# Patient Record
Sex: FEMALE | Race: WHITE | NOT HISPANIC OR LATINO | Employment: FULL TIME | URBAN - METROPOLITAN AREA
[De-identification: names, ages, dates, MRNs, and addresses within clinical notes are randomized per-mention and may not be internally consistent; named-entity substitution may affect disease eponyms.]

---

## 2023-11-04 PROBLEM — E10.9 DIABETES MELLITUS TYPE 1 (MULTI): Status: ACTIVE | Noted: 2023-11-04

## 2023-11-04 PROBLEM — F41.9 ANXIETY: Status: ACTIVE | Noted: 2023-11-04

## 2023-11-04 PROBLEM — I10 HYPERTENSION, BENIGN: Status: ACTIVE | Noted: 2023-11-04

## 2023-11-04 PROBLEM — Z96.41 INSULIN PUMP IN PLACE: Status: ACTIVE | Noted: 2023-11-04

## 2023-11-04 PROBLEM — E78.5 HYPERLIPIDEMIA: Status: ACTIVE | Noted: 2023-11-04

## 2023-11-04 RX ORDER — BLOOD-GLUCOSE SENSOR
EACH MISCELLANEOUS
COMMUNITY

## 2023-11-04 RX ORDER — ESCITALOPRAM OXALATE 20 MG/1
20 TABLET ORAL
COMMUNITY
Start: 2022-04-20

## 2023-11-04 RX ORDER — INSULIN DEGLUDEC 100 U/ML
INJECTION, SOLUTION SUBCUTANEOUS
COMMUNITY
Start: 2020-12-03

## 2023-11-04 RX ORDER — EZETIMIBE 10 MG/1
10 TABLET ORAL
COMMUNITY
Start: 2022-04-12

## 2023-11-04 RX ORDER — BLOOD-GLUCOSE TRANSMITTER
EACH MISCELLANEOUS AS NEEDED
COMMUNITY

## 2023-11-04 RX ORDER — BLOOD-GLUCOSE METER
EACH MISCELLANEOUS
COMMUNITY
Start: 2021-03-23

## 2023-11-04 RX ORDER — PEN NEEDLE, DIABETIC 30 GX3/16"
NEEDLE, DISPOSABLE MISCELLANEOUS
COMMUNITY

## 2023-11-04 RX ORDER — INSULIN ASPART 100 [IU]/ML
INJECTION, SOLUTION INTRAVENOUS; SUBCUTANEOUS
COMMUNITY
Start: 2022-06-09

## 2023-11-04 RX ORDER — NAPROXEN SODIUM 220 MG/1
81 TABLET, FILM COATED ORAL DAILY
COMMUNITY
Start: 2019-09-25

## 2023-11-04 RX ORDER — LISINOPRIL 10 MG/1
1 TABLET ORAL DAILY
COMMUNITY
Start: 2022-03-30 | End: 2024-03-27

## 2023-11-04 RX ORDER — INSULIN PMP CART,AUT,G6/7,CNTR
EACH SUBCUTANEOUS
COMMUNITY
End: 2023-11-06 | Stop reason: ALTCHOICE

## 2023-11-04 RX ORDER — SEMAGLUTIDE 0.68 MG/ML
0.5 INJECTION, SOLUTION SUBCUTANEOUS
COMMUNITY
End: 2023-11-06 | Stop reason: SINTOL

## 2023-11-04 RX ORDER — OMEPRAZOLE 20 MG/1
1 CAPSULE, DELAYED RELEASE ORAL DAILY
COMMUNITY
Start: 2022-05-25 | End: 2024-05-01 | Stop reason: SDUPTHER

## 2023-11-04 RX ORDER — ACETAMINOPHEN 500 MG
5000 TABLET ORAL
COMMUNITY
Start: 2019-09-25

## 2023-11-04 RX ORDER — ROSUVASTATIN CALCIUM 5 MG/1
1 TABLET, COATED ORAL DAILY
COMMUNITY
Start: 2019-09-25 | End: 2023-11-06 | Stop reason: ALTCHOICE

## 2023-11-06 ENCOUNTER — OFFICE VISIT (OUTPATIENT)
Dept: ENDOCRINOLOGY | Facility: CLINIC | Age: 48
End: 2023-11-06
Payer: COMMERCIAL

## 2023-11-06 ENCOUNTER — LAB (OUTPATIENT)
Dept: LAB | Facility: LAB | Age: 48
End: 2023-11-06
Payer: COMMERCIAL

## 2023-11-06 VITALS
WEIGHT: 193 LBS | SYSTOLIC BLOOD PRESSURE: 114 MMHG | BODY MASS INDEX: 31.02 KG/M2 | HEIGHT: 66 IN | DIASTOLIC BLOOD PRESSURE: 72 MMHG

## 2023-11-06 DIAGNOSIS — Z96.41 INSULIN PUMP IN PLACE: ICD-10-CM

## 2023-11-06 DIAGNOSIS — E10.9 TYPE 1 DIABETES MELLITUS WITHOUT COMPLICATION (MULTI): Primary | ICD-10-CM

## 2023-11-06 DIAGNOSIS — R53.83 OTHER FATIGUE: ICD-10-CM

## 2023-11-06 DIAGNOSIS — E78.5 HYPERLIPIDEMIA, UNSPECIFIED HYPERLIPIDEMIA TYPE: ICD-10-CM

## 2023-11-06 LAB
25(OH)D3 SERPL-MCNC: 62 NG/ML (ref 30–100)
POC FINGERSTICK BLOOD GLUCOSE: 153 MG/DL (ref 70–100)
POC HEMOGLOBIN A1C: 7.1 % (ref 4.2–6.5)
T4 FREE SERPL-MCNC: 0.88 NG/DL (ref 0.61–1.12)
TSH SERPL-ACNC: 1.76 MIU/L (ref 0.44–3.98)
VIT B12 SERPL-MCNC: 722 PG/ML (ref 211–911)

## 2023-11-06 PROCEDURE — 82306 VITAMIN D 25 HYDROXY: CPT

## 2023-11-06 PROCEDURE — 84443 ASSAY THYROID STIM HORMONE: CPT

## 2023-11-06 PROCEDURE — 4010F ACE/ARB THERAPY RXD/TAKEN: CPT | Performed by: HOSPITALIST

## 2023-11-06 PROCEDURE — 82962 GLUCOSE BLOOD TEST: CPT | Performed by: HOSPITALIST

## 2023-11-06 PROCEDURE — 82607 VITAMIN B-12: CPT

## 2023-11-06 PROCEDURE — 84439 ASSAY OF FREE THYROXINE: CPT

## 2023-11-06 PROCEDURE — 3074F SYST BP LT 130 MM HG: CPT | Performed by: HOSPITALIST

## 2023-11-06 PROCEDURE — 86038 ANTINUCLEAR ANTIBODIES: CPT

## 2023-11-06 PROCEDURE — 83036 HEMOGLOBIN GLYCOSYLATED A1C: CPT | Performed by: HOSPITALIST

## 2023-11-06 PROCEDURE — 36415 COLL VENOUS BLD VENIPUNCTURE: CPT

## 2023-11-06 PROCEDURE — 99214 OFFICE O/P EST MOD 30 MIN: CPT | Performed by: HOSPITALIST

## 2023-11-06 PROCEDURE — 3078F DIAST BP <80 MM HG: CPT | Performed by: HOSPITALIST

## 2023-11-06 RX ORDER — SCOLOPAMINE TRANSDERMAL SYSTEM 1 MG/1
PATCH, EXTENDED RELEASE TRANSDERMAL
COMMUNITY
Start: 2023-06-13

## 2023-11-06 RX ORDER — ALBUTEROL SULFATE 90 UG/1
AEROSOL, METERED RESPIRATORY (INHALATION)
COMMUNITY
Start: 2022-12-02

## 2023-11-06 RX ORDER — IBUPROFEN 600 MG/1
600 TABLET ORAL EVERY 6 HOURS PRN
COMMUNITY
Start: 2022-12-02

## 2023-11-06 RX ORDER — PRAVASTATIN SODIUM 20 MG/1
20 TABLET ORAL DAILY
COMMUNITY
Start: 2023-10-25 | End: 2024-02-05 | Stop reason: SINTOL

## 2023-11-06 RX ORDER — INSULIN ASPART 100 [IU]/ML
100 INJECTION, SOLUTION INTRAVENOUS; SUBCUTANEOUS DAILY
COMMUNITY
Start: 2023-09-25 | End: 2024-02-19

## 2023-11-06 ASSESSMENT — ENCOUNTER SYMPTOMS
VOMITING: 0
FATIGUE: 1
TREMORS: 0
NERVOUS/ANXIOUS: 0
LIGHT-HEADEDNESS: 0
PHOTOPHOBIA: 0
DIARRHEA: 0
SLEEP DISTURBANCE: 0
CHEST TIGHTNESS: 0
VOICE CHANGE: 0
FREQUENCY: 0
CONSTIPATION: 0
ABDOMINAL DISTENTION: 0
DYSURIA: 0
EYE ITCHING: 0
HEADACHES: 0
ABDOMINAL PAIN: 0
NAUSEA: 0
ARTHRALGIAS: 0
PALPITATIONS: 0
TROUBLE SWALLOWING: 0
SHORTNESS OF BREATH: 0
AGITATION: 0
SORE THROAT: 0

## 2023-11-06 NOTE — PROGRESS NOTES
Subjective   Patient ID: Tessy Barone is a 48 y.o. female who presents for Diabetes (Dx DM1: 9/1990/Patient testing glucose 4 times daily with cgm/due to fluctuating blood glucose, hypoglycemia and insulin pump management /Patient is adjusting insulin three times daily based on glucose readings and/or carb counting./ using tandum and dexcom currently; was on omnipod but glucose was higher; also stopped ozempic a couple weeks ago- GI upset /PCP: Dr. Jovel /eye doctor: yearly/podiatry: does not see one  /Last hga1c 7/17/2023 7.0%).  HPI  See AP     Review of Systems   Constitutional:  Positive for fatigue.   HENT:  Negative for sore throat, trouble swallowing and voice change.    Eyes:  Negative for photophobia, itching and visual disturbance.   Respiratory:  Negative for chest tightness and shortness of breath.    Cardiovascular:  Negative for chest pain and palpitations.   Gastrointestinal:  Negative for abdominal distention, abdominal pain, constipation, diarrhea, nausea and vomiting.   Endocrine: Negative for cold intolerance, heat intolerance and polyuria.   Genitourinary:  Negative for dysuria and frequency.   Musculoskeletal:  Negative for arthralgias.   Skin:  Negative for pallor.   Allergic/Immunologic: Negative for environmental allergies.   Neurological:  Negative for tremors, light-headedness and headaches.   Psychiatric/Behavioral:  Negative for agitation and sleep disturbance. The patient is not nervous/anxious.         Memory issues        Objective   Physical Exam  Constitutional:       Appearance: Normal appearance.   HENT:      Head: Normocephalic.      Nose: Nose normal.      Mouth/Throat:      Mouth: Mucous membranes are moist.   Eyes:      Extraocular Movements: Extraocular movements intact.   Cardiovascular:      Rate and Rhythm: Normal rate.   Pulmonary:      Effort: Pulmonary effort is normal. No respiratory distress.   Abdominal:      General: There is no distension.   Musculoskeletal:          General: Normal range of motion.      Cervical back: Normal range of motion and neck supple.   Skin:     General: Skin is warm and dry.   Neurological:      Mental Status: She is alert and oriented to person, place, and time.   Psychiatric:         Mood and Affect: Mood normal.         Assessment/Plan   Diagnoses and all orders for this visit:  Type 1 diabetes mellitus without complication (CMS/[facility])  -     POCT glucose manually resulted  -     POCT glycosylated hemoglobin (Hb A1C) manually resulted  Other fatigue  -     TSH with reflex to Free T4 if abnormal; Future  -     T4, free; Future  -     Vitamin B12; Future  -     Vitamin D 25-Hydroxy,Total (for eval of Vitamin D levels); Future  -     DAGOBERTO with Reflex to CAM; Future  Insulin pump in place  Hyperlipidemia, unspecified hyperlipidemia type      T1 DM: A1c slightly worse and above goal    she is on tandem T slim and Dexcom G6 CGM   Personally reviewed insulin pump data and interpreted the results   she is in control IQ currently. active 98 %.   TIR 64 % , with 1 % low BG   Entering some phantom carbs and sometimes after eating   Post prandial hyperglycemia- eating more in evenineg    Comfortable in carb  counting     TRIED OMNIPOD 5 PUMP FOR 1-2 M AND HAD A LOT OF ISSUES WITH HER BG. High BG after stopping Ozempic as well      PLAN :   - advised to bolus 2/3 carbs before eating at least as she fears low BG   - no change in pump   - advised not to enter phantom carbs      STOPPED statin  in past and it helped with her joint pain   follow cardiology , on zetia now.      # obesity : BMI ~ 31   started ozempic since jan 2023 - had SE even with lower doses. GI issues.   Sept 18 th - stopped ozempic due to GI issues.   Discussed off label trulicity/ mounjaro - discussed SE similar to ozempic   She will chcek with insurance for coverage.   - in past discussed moderation and mindful eating and doing something that is sutainable ,   - discussed exercise and adding  resistance excerise- she loves dancing   - in past discussed Mediterranean diet , discussed IF and calorie deficit     SH- she has 3 kids 20 yo boy ( T 1 DM dx4 yrs ago) , 19 yo daughtr and 12 yo daughter.      RTC 4,m

## 2023-11-07 ENCOUNTER — TELEPHONE (OUTPATIENT)
Dept: ENDOCRINOLOGY | Facility: CLINIC | Age: 48
End: 2023-11-07
Payer: COMMERCIAL

## 2023-11-07 LAB — ANA SER QL HEP2 SUBST: NEGATIVE

## 2023-11-07 NOTE — TELEPHONE ENCOUNTER
Tessy left a voice mail stating that you and her were talking about Mounjaro and you wanted her to check to see if her insurance would cover  It.  They will so she asked that you send it to the local [facility] in [place].

## 2023-11-08 DIAGNOSIS — E10.69 TYPE 1 DIABETES MELLITUS WITH OTHER SPECIFIED COMPLICATION (MULTI): Primary | ICD-10-CM

## 2023-11-08 RX ORDER — TIRZEPATIDE 2.5 MG/.5ML
2.5 INJECTION, SOLUTION SUBCUTANEOUS
Qty: 6 ML | Refills: 1 | Status: SHIPPED | OUTPATIENT
Start: 2023-11-08 | End: 2024-02-05 | Stop reason: ALTCHOICE

## 2023-11-08 RX ORDER — TIRZEPATIDE 2.5 MG/.5ML
2.5 INJECTION, SOLUTION SUBCUTANEOUS
Qty: 6 ML | Refills: 1 | Status: SHIPPED | OUTPATIENT
Start: 2023-11-08 | End: 2023-11-08 | Stop reason: SDUPTHER

## 2023-12-08 DIAGNOSIS — E10.9 TYPE 1 DIABETES MELLITUS WITHOUT COMPLICATION (MULTI): Primary | ICD-10-CM

## 2023-12-08 RX ORDER — TIRZEPATIDE 5 MG/.5ML
5 INJECTION, SOLUTION SUBCUTANEOUS
Qty: 6 ML | Refills: 2 | Status: SHIPPED | OUTPATIENT
Start: 2023-12-08 | End: 2024-05-01 | Stop reason: SDUPTHER

## 2024-01-04 DIAGNOSIS — E78.5 HYPERLIPIDEMIA, UNSPECIFIED HYPERLIPIDEMIA TYPE: Primary | ICD-10-CM

## 2024-01-04 RX ORDER — ROSUVASTATIN CALCIUM 5 MG/1
5 TABLET, COATED ORAL DAILY
Qty: 90 TABLET | Refills: 1 | Status: SHIPPED | OUTPATIENT
Start: 2024-01-04 | End: 2024-02-05 | Stop reason: SINTOL

## 2024-02-05 ENCOUNTER — TELEMEDICINE (OUTPATIENT)
Dept: ENDOCRINOLOGY | Facility: CLINIC | Age: 49
End: 2024-02-05
Payer: COMMERCIAL

## 2024-02-05 ENCOUNTER — TELEPHONE (OUTPATIENT)
Dept: ENDOCRINOLOGY | Facility: CLINIC | Age: 49
End: 2024-02-05

## 2024-02-05 DIAGNOSIS — E78.5 HYPERLIPIDEMIA, UNSPECIFIED HYPERLIPIDEMIA TYPE: ICD-10-CM

## 2024-02-05 DIAGNOSIS — E10.65 TYPE 1 DIABETES MELLITUS WITH HYPERGLYCEMIA, WITH LONG-TERM CURRENT USE OF INSULIN (MULTI): Primary | ICD-10-CM

## 2024-02-05 DIAGNOSIS — Z46.81 INSULIN PUMP FITTING OR ADJUSTMENT: ICD-10-CM

## 2024-02-05 DIAGNOSIS — E66.09 CLASS 1 OBESITY DUE TO EXCESS CALORIES WITH SERIOUS COMORBIDITY AND BODY MASS INDEX (BMI) OF 31.0 TO 31.9 IN ADULT: ICD-10-CM

## 2024-02-05 PROCEDURE — 95251 CONT GLUC MNTR ANALYSIS I&R: CPT | Performed by: NURSE PRACTITIONER

## 2024-02-05 PROCEDURE — 99214 OFFICE O/P EST MOD 30 MIN: CPT | Performed by: NURSE PRACTITIONER

## 2024-02-05 ASSESSMENT — ENCOUNTER SYMPTOMS
APPETITE CHANGE: 0
SEIZURES: 0
NERVOUS/ANXIOUS: 0
FREQUENCY: 0
NAUSEA: 0
POLYPHAGIA: 0
SLEEP DISTURBANCE: 0
POLYDIPSIA: 0
FATIGUE: 0
PALPITATIONS: 0
DIARRHEA: 0
DIZZINESS: 0
WEAKNESS: 0
CONSTIPATION: 0
NUMBNESS: 0
SHORTNESS OF BREATH: 0
ACTIVITY CHANGE: 0

## 2024-02-05 NOTE — PROGRESS NOTES
Subjective   Tessy Barone is a 48 y.o. female who presents for an initial visit for evaluation of Type 1 diabetes mellitus. The initial diagnosis of diabetes was made  age 15 .     Known complications due to diabetes included obesity    Cardiovascular risk factors include diabetes mellitus, dyslipidemia, hypertension, and obesity (BMI >= 30 kg/m2). The patient is on an ACE inhibitor or angiotensin II receptor blocker.      Current diabetes regimen is as follows:   Tandem X2 insulin pump with Dexcom G6 continuous glucose sensor checking 6-10 times per day.   Mounjaro 5 mg weekly    Hypoglycemia frequency: 1%  Hypoglycemia awareness: Yes     Exercise: intermittently   Meal panning: She is using carbohydrate counting.    Review of Systems   Constitutional:  Negative for activity change, appetite change and fatigue.   Respiratory:  Negative for shortness of breath.    Cardiovascular:  Negative for chest pain, palpitations and leg swelling.   Gastrointestinal:  Negative for constipation, diarrhea and nausea.   Endocrine: Negative for cold intolerance, heat intolerance, polydipsia, polyphagia and polyuria.   Genitourinary:  Negative for frequency.   Musculoskeletal:  Negative for gait problem.   Skin:  Negative for rash.   Neurological:  Negative for dizziness, seizures, weakness and numbness.   Psychiatric/Behavioral:  Negative for sleep disturbance and suicidal ideas. The patient is not nervous/anxious.        Objective   There were no vitals taken for this visit.  Physical Exam  Vitals and nursing note reviewed.   HENT:      Head: Atraumatic.   Pulmonary:      Effort: Pulmonary effort is normal.   Neurological:      General: No focal deficit present.      Mental Status: She is alert and oriented to person, place, and time. Mental status is at baseline.      Gait: Gait normal.   Psychiatric:         Mood and Affect: Mood normal.         Behavior: Behavior normal.         Thought Content: Thought content normal.          Judgment: Judgment normal.           Health Maintenance:   Foot Exam: None  Eye Exam: Yearly, denies retinopathy.   Lipid Panel: Total 210 with Non , . Cannot tolerate statins.   Urine Albumin: <12 March 2023    Assessment/Plan   Diagnoses and all orders for this visit:  Type 1 diabetes mellitus with hyperglycemia, with long-term current use of insulin (CMS/[facility])  Insulin pump fitting or adjustment  Hyperlipidemia, unspecified hyperlipidemia type  Class 1 obesity due to excess calories with serious comorbidity and body mass index (BMI) of 31.0 to 31.9 in adult    Type 1 diabetes mellitus, is not at goal. GMI 7.1%  RX changes:   No changes to pump settings at this time. Discussed bolusing for glucose 20 minutes before eating and only bolus for 20 grams at a time for food.   Discussed increasing Mounjaro to 7.5 mg weekly and she will wait to discuss with Dr Juárez.   Hypertension: At goal no changes.   Hyperlipidemia: Patient has been instructed to message Dr Foster to discuss other treatment options for hyperlipidemia as she cannot tolerate statins.   Education:  blood sugar goals and complications of diabetes mellitus  Follow up: I recommend diabetes care be Dr Juárez in May 2024

## 2024-02-05 NOTE — PATIENT INSTRUCTIONS
Type 1 diabetes mellitus, is not at goal. GMI 7.1%  RX changes:   No changes to pump settings at this time. Discussed bolusing for glucose 20 minutes before eating and only bolus for 20 grams at a time for food.   Discussed increasing Mounjaro to 7.5 mg weekly and she will wait to discuss with Dr Juárez.   Hypertension: At goal no changes.   Hyperlipidemia: Patient has been instructed to message Dr Foster to discuss other treatment options for hyperlipidemia as she cannot tolerate statins.   Education:  blood sugar goals and complications of diabetes mellitus  Follow up: I recommend diabetes care be Dr Juárez in May 2024

## 2024-02-05 NOTE — TELEPHONE ENCOUNTER
Patient of Dr. Juárez's who saw Tessy as a courtesy on 2/5 requesting office note be sent to her DME for supplies.     Note from 2/5 faxed/emailed to contact at [employer] on 2/5

## 2024-02-16 DIAGNOSIS — E10.65 TYPE 1 DIABETES MELLITUS WITH HYPERGLYCEMIA, WITH LONG-TERM CURRENT USE OF INSULIN (MULTI): Primary | ICD-10-CM

## 2024-02-19 RX ORDER — INSULIN ASPART 100 [IU]/ML
INJECTION, SOLUTION INTRAVENOUS; SUBCUTANEOUS
Qty: 90 ML | Refills: 2 | Status: SHIPPED | OUTPATIENT
Start: 2024-02-19

## 2024-02-21 ENCOUNTER — PATIENT MESSAGE (OUTPATIENT)
Dept: ENDOCRINOLOGY | Facility: CLINIC | Age: 49
End: 2024-02-21
Payer: COMMERCIAL

## 2024-02-21 DIAGNOSIS — E10.65 TYPE 1 DIABETES MELLITUS WITH HYPERGLYCEMIA, WITH LONG-TERM CURRENT USE OF INSULIN (MULTI): Primary | ICD-10-CM

## 2024-02-22 ENCOUNTER — TELEPHONE (OUTPATIENT)
Dept: ENDOCRINOLOGY | Facility: CLINIC | Age: 49
End: 2024-02-22
Payer: COMMERCIAL

## 2024-02-22 NOTE — TELEPHONE ENCOUNTER
From: Tessy Barone  To: Tessy Perez, IRA-CNP  Sent: 2/21/2024 4:48 PM EST  Subject: Increasing Mounjaro Dosage    Hello,  I have been considering increasing my Mounjaro shot to 7.5 like we talked about. Would it be possible for you to put in a prescription to the [facility] on [address] in [place] for a 7.5 dosage of the Mounjaro?   Thank you,  Tessy Barone

## 2024-03-18 ENCOUNTER — PATIENT MESSAGE (OUTPATIENT)
Dept: ENDOCRINOLOGY | Facility: CLINIC | Age: 49
End: 2024-03-18
Payer: COMMERCIAL

## 2024-03-18 DIAGNOSIS — E11.65 TYPE 2 DIABETES MELLITUS WITH HYPERGLYCEMIA, WITH LONG-TERM CURRENT USE OF INSULIN (MULTI): Primary | ICD-10-CM

## 2024-03-18 DIAGNOSIS — Z79.4 TYPE 2 DIABETES MELLITUS WITH HYPERGLYCEMIA, WITH LONG-TERM CURRENT USE OF INSULIN (MULTI): Primary | ICD-10-CM

## 2024-03-19 NOTE — TELEPHONE ENCOUNTER
From: Tessy Barone  To: Tessy Perez, APRN-CNP  Sent: 3/18/2024 3:11 PM EDT  Subject: Mounjaro    Hello!   I have another question about Mounjaro. Due to a shortage of Mounjaro with the manufacture I was not able to receive the 7.5 Mounjaro refill you put in for me a month ago. Since I had some 2.5 left over from when I began taking it, I just took one with the 5 dosage to give me the total dosage of 7.5. The pharmacy told me that anything above a 5 is on back order. Friday morning I used the last pen of my 2.5. My sugar readings have been good, but could be a little better. I’ve taken a total of 7.5 for 4 weeks now. What would you advise for me to do this coming up Friday, take two shots of the 5 to give me a total of 10? Or do you think I should stay on 7.5? If so, I’ll need a script sent in for the 2.5 dosage to the [facility] in [place]. I have two boxes of the 5.0 left, which depending on what you say should last me a month or two. Hope this all makes sense! Thanks for you help with this!   -Tessy Barone  [phone]

## 2024-03-27 DIAGNOSIS — Z79.4 TYPE 2 DIABETES MELLITUS WITH HYPERGLYCEMIA, WITH LONG-TERM CURRENT USE OF INSULIN (MULTI): Primary | ICD-10-CM

## 2024-03-27 DIAGNOSIS — E11.65 TYPE 2 DIABETES MELLITUS WITH HYPERGLYCEMIA, WITH LONG-TERM CURRENT USE OF INSULIN (MULTI): Primary | ICD-10-CM

## 2024-03-27 RX ORDER — LISINOPRIL 10 MG/1
10 TABLET ORAL DAILY
Qty: 90 TABLET | Refills: 1 | Status: SHIPPED | OUTPATIENT
Start: 2024-03-27

## 2024-04-22 ENCOUNTER — TELEPHONE (OUTPATIENT)
Dept: ENDOCRINOLOGY | Facility: CLINIC | Age: 49
End: 2024-04-22
Payer: COMMERCIAL

## 2024-04-22 NOTE — TELEPHONE ENCOUNTER
Tessy left a voice mail for a return call.  She is having surgery in May and  Her  Is saying she has to see Dr. Juárez prior to even scheduling it.  Please  Call her to discuss.  We don't have any available appt. And usually it is the  Primary right?  Her number is [phone]

## 2024-04-24 DIAGNOSIS — E10.65 TYPE 1 DIABETES MELLITUS WITH HYPERGLYCEMIA, WITH LONG-TERM CURRENT USE OF INSULIN (MULTI): ICD-10-CM

## 2024-04-26 ENCOUNTER — LAB (OUTPATIENT)
Dept: LAB | Facility: LAB | Age: 49
End: 2024-04-26
Payer: COMMERCIAL

## 2024-04-26 DIAGNOSIS — E10.65 TYPE 1 DIABETES MELLITUS WITH HYPERGLYCEMIA, WITH LONG-TERM CURRENT USE OF INSULIN (MULTI): ICD-10-CM

## 2024-04-26 LAB
ALBUMIN SERPL BCP-MCNC: 4.1 G/DL (ref 3.4–5)
ALP SERPL-CCNC: 48 U/L (ref 33–110)
ALT SERPL W P-5'-P-CCNC: 10 U/L (ref 7–45)
ANION GAP SERPL CALC-SCNC: 8 MMOL/L (ref 10–20)
AST SERPL W P-5'-P-CCNC: 15 U/L (ref 9–39)
BILIRUB SERPL-MCNC: 0.8 MG/DL (ref 0–1.2)
BUN SERPL-MCNC: 10 MG/DL (ref 6–23)
CALCIUM SERPL-MCNC: 9 MG/DL (ref 8.6–10.3)
CHLORIDE SERPL-SCNC: 104 MMOL/L (ref 98–107)
CHOLEST SERPL-MCNC: 180 MG/DL (ref 0–199)
CHOLESTEROL/HDL RATIO: 3.9
CO2 SERPL-SCNC: 25 MMOL/L (ref 21–32)
CREAT SERPL-MCNC: 0.69 MG/DL (ref 0.5–1.05)
CREAT UR-MCNC: 511.7 MG/DL (ref 20–320)
EGFRCR SERPLBLD CKD-EPI 2021: >90 ML/MIN/1.73M*2
EST. AVERAGE GLUCOSE BLD GHB EST-MCNC: 151 MG/DL
GLUCOSE SERPL-MCNC: 208 MG/DL (ref 74–99)
HBA1C MFR BLD: 6.9 %
HDLC SERPL-MCNC: 46.1 MG/DL
LDLC SERPL CALC-MCNC: 123 MG/DL
MICROALBUMIN UR-MCNC: 47.1 MG/L
MICROALBUMIN/CREAT UR: 9.2 UG/MG CREAT
NON HDL CHOLESTEROL: 134 MG/DL (ref 0–149)
POTASSIUM SERPL-SCNC: 4.3 MMOL/L (ref 3.5–5.3)
PROT SERPL-MCNC: 7.1 G/DL (ref 6.4–8.2)
SODIUM SERPL-SCNC: 133 MMOL/L (ref 136–145)
TRIGL SERPL-MCNC: 56 MG/DL (ref 0–149)
VLDL: 11 MG/DL (ref 0–40)

## 2024-04-26 PROCEDURE — 83036 HEMOGLOBIN GLYCOSYLATED A1C: CPT

## 2024-04-26 PROCEDURE — 80061 LIPID PANEL: CPT

## 2024-04-26 PROCEDURE — 82043 UR ALBUMIN QUANTITATIVE: CPT

## 2024-04-26 PROCEDURE — 82570 ASSAY OF URINE CREATININE: CPT

## 2024-04-26 PROCEDURE — 80053 COMPREHEN METABOLIC PANEL: CPT

## 2024-04-26 PROCEDURE — 36415 COLL VENOUS BLD VENIPUNCTURE: CPT

## 2024-05-01 ENCOUNTER — TELEMEDICINE (OUTPATIENT)
Dept: ENDOCRINOLOGY | Facility: CLINIC | Age: 49
End: 2024-05-01
Payer: COMMERCIAL

## 2024-05-01 DIAGNOSIS — E10.65 TYPE 1 DIABETES MELLITUS WITH HYPERGLYCEMIA, WITH LONG-TERM CURRENT USE OF INSULIN (MULTI): Primary | ICD-10-CM

## 2024-05-01 DIAGNOSIS — Z96.41 INSULIN PUMP IN PLACE: ICD-10-CM

## 2024-05-01 DIAGNOSIS — E10.9 TYPE 1 DIABETES MELLITUS WITHOUT COMPLICATION (MULTI): ICD-10-CM

## 2024-05-01 DIAGNOSIS — E78.5 HYPERLIPIDEMIA, UNSPECIFIED HYPERLIPIDEMIA TYPE: ICD-10-CM

## 2024-05-01 RX ORDER — OMEPRAZOLE 20 MG/1
20 CAPSULE, DELAYED RELEASE ORAL DAILY
Qty: 90 CAPSULE | Refills: 2 | Status: SHIPPED | OUTPATIENT
Start: 2024-05-01

## 2024-05-01 RX ORDER — TIRZEPATIDE 5 MG/.5ML
5 INJECTION, SOLUTION SUBCUTANEOUS
Qty: 6 ML | Refills: 2 | Status: SHIPPED | OUTPATIENT
Start: 2024-05-01

## 2024-05-01 ASSESSMENT — ENCOUNTER SYMPTOMS
DIARRHEA: 0
NERVOUS/ANXIOUS: 0
HEADACHES: 0
AGITATION: 0
LIGHT-HEADEDNESS: 0
EYE ITCHING: 0
SHORTNESS OF BREATH: 0
DYSURIA: 0
PHOTOPHOBIA: 0
ARTHRALGIAS: 0
ABDOMINAL DISTENTION: 0
VOICE CHANGE: 0
CONSTIPATION: 0
SLEEP DISTURBANCE: 0
TREMORS: 0
NAUSEA: 0
TROUBLE SWALLOWING: 0
SORE THROAT: 0
CHEST TIGHTNESS: 0
FATIGUE: 1
PALPITATIONS: 0
VOMITING: 0
FREQUENCY: 0
ABDOMINAL PAIN: 0

## 2024-05-01 NOTE — PROGRESS NOTES
Subjective   Patient ID: Tessy Barone is a 49 y.o. female who presents for Diabetes (VIRTUAL VISIT::: discuss DM; planning on having breast reduction surgery/Dx DM1: 9/1990/Patient testing glucose 4 times daily with cgm/due to fluctuating blood glucose, hypoglycemia and insulin pump management /Patient is adjusting insulin three times daily based on glucose readings and/or carb counting./ using tandum and dexcom-not linked. PCP: Dr. Jovel /eye doctor: yearly/podiatry: does not see one//Failed: ozempic (GI upset), Omnipod 5 (hyperglycemia)).  Lab Results   Component Value Date    HGBA1C 6.9 (H) 04/26/2024      HPI  See assessment and plan  Review of Systems   Constitutional:  Positive for fatigue.   HENT:  Negative for sore throat, trouble swallowing and voice change.    Eyes:  Negative for photophobia, itching and visual disturbance.   Respiratory:  Negative for chest tightness and shortness of breath.    Cardiovascular:  Negative for chest pain and palpitations.   Gastrointestinal:  Negative for abdominal distention, abdominal pain, constipation, diarrhea, nausea and vomiting.   Endocrine: Negative for cold intolerance, heat intolerance and polyuria.   Genitourinary:  Negative for dysuria and frequency.   Musculoskeletal:  Negative for arthralgias.   Skin:  Negative for pallor.   Allergic/Immunologic: Negative for environmental allergies.   Neurological:  Negative for tremors, light-headedness and headaches.   Psychiatric/Behavioral:  Negative for agitation and sleep disturbance. The patient is not nervous/anxious.        Objective    No vitals due to virtual visit  NAD  Alert oriented  EOM normal  Mood appropriate    Assessment/Plan   Diagnoses and all orders for this visit:  Type 1 diabetes mellitus with hyperglycemia, with long-term current use of insulin (Multi)  -     omeprazole (PriLOSEC) 20 mg DR capsule; Take 1 capsule (20 mg) by mouth once daily.  Type 1 diabetes mellitus without complication  (Multi)  -     tirzepatide (Mounjaro) 5 mg/0.5 mL pen injector; Inject 5 mg under the skin every 7 days.  Insulin pump in place  Hyperlipidemia, unspecified hyperlipidemia type         T1 DM: A1c at goal     she is on tandem T slim and Dexcom G6 CGM   She is also on Mounjaro 7.5 mg weekly(due to shortage she has been taking 5 mg and 2.5 mg together)     Blood sugar improved a lot while on Mounjaro.  She has lost 15 pounds in total.  She does have nausea, rare constipation, burping    She is not linked for her pump.  Could not download her pump data.  She mention she has been having overnight low blood sugars and did change her settings.    She is going for breast reduction surgery soon  A1c at goal.    In past she did enter some phantom carbs and sometimes after eating    Comfortable in carb  counting      TRIED OMNIPOD 5 PUMP FOR 1-2 M AND HAD A LOT OF ISSUES WITH HER BG.         PLAN :   -Given shortage of 7.5 mg Mounjaro mild side effects on this dose.  We discussed decreasing the Mounjaro to 5 mg once weekly which she is agreeable.  -No changes in pump setting advised.  -Advised to use activity mode the night before her breast reduction surgery.  -In past advised not to enter phantom carbs   -Did not discuss insulin pump failure plan  -Hypoglycemia management  -Microalbuminuria+, not on ACE/ARB      STOPPED statin  in past and it helped with her joint pain   Following with cardiology , on zetia now.       # obesity : BMI ~ 31   started ozempic since jan 2023 - had SE even with lower doses. GI issues.   Sept 18 th - stopped ozempic due to GI issues.  Currently on Mounjaro-he has lost 15 pounds in total.  She does have nausea, rare constipation, burping  Given shortage of 7.5 mg Mounjaro mild side effects on this dose.  We discussed decreasing the Mounjaro to 5 mg once weekly which she is agreeable.   in past discussed moderation and mindful eating and doing something that is sutainable ,      # Hyperlipidemia:  LDL above goal  Following with cardiology     RTC 4,m      SH- she has 3 kids 20 yo boy ( T 1 DM dx4 yrs ago) , 17 yo daughtr and 14 yo daughter.   She is a teacher

## 2024-07-08 ENCOUNTER — APPOINTMENT (OUTPATIENT)
Dept: ENDOCRINOLOGY | Facility: CLINIC | Age: 49
End: 2024-07-08
Payer: COMMERCIAL

## 2024-07-08 VITALS
SYSTOLIC BLOOD PRESSURE: 112 MMHG | WEIGHT: 174 LBS | BODY MASS INDEX: 27.97 KG/M2 | DIASTOLIC BLOOD PRESSURE: 79 MMHG | HEIGHT: 66 IN

## 2024-07-08 DIAGNOSIS — E78.5 HYPERLIPIDEMIA, UNSPECIFIED HYPERLIPIDEMIA TYPE: ICD-10-CM

## 2024-07-08 DIAGNOSIS — Z96.41 INSULIN PUMP IN PLACE: ICD-10-CM

## 2024-07-08 DIAGNOSIS — E66.3 OVERWEIGHT (BMI 25.0-29.9): ICD-10-CM

## 2024-07-08 DIAGNOSIS — E10.65 TYPE 1 DIABETES MELLITUS WITH HYPERGLYCEMIA, WITH LONG-TERM CURRENT USE OF INSULIN (MULTI): Primary | ICD-10-CM

## 2024-07-08 DIAGNOSIS — I10 HYPERTENSION, BENIGN: ICD-10-CM

## 2024-07-08 LAB
POC FINGERSTICK BLOOD GLUCOSE: 142 MG/DL (ref 70–100)
POC HEMOGLOBIN A1C: 6.5 % (ref 4.2–6.5)

## 2024-07-08 PROCEDURE — 3008F BODY MASS INDEX DOCD: CPT | Performed by: HOSPITALIST

## 2024-07-08 PROCEDURE — 82962 GLUCOSE BLOOD TEST: CPT | Performed by: HOSPITALIST

## 2024-07-08 PROCEDURE — 3078F DIAST BP <80 MM HG: CPT | Performed by: HOSPITALIST

## 2024-07-08 PROCEDURE — 83036 HEMOGLOBIN GLYCOSYLATED A1C: CPT | Performed by: HOSPITALIST

## 2024-07-08 PROCEDURE — 3060F POS MICROALBUMINURIA REV: CPT | Performed by: HOSPITALIST

## 2024-07-08 PROCEDURE — 99214 OFFICE O/P EST MOD 30 MIN: CPT | Performed by: HOSPITALIST

## 2024-07-08 PROCEDURE — 3074F SYST BP LT 130 MM HG: CPT | Performed by: HOSPITALIST

## 2024-07-08 PROCEDURE — 3049F LDL-C 100-129 MG/DL: CPT | Performed by: HOSPITALIST

## 2024-07-08 PROCEDURE — 3044F HG A1C LEVEL LT 7.0%: CPT | Performed by: HOSPITALIST

## 2024-07-08 PROCEDURE — 4010F ACE/ARB THERAPY RXD/TAKEN: CPT | Performed by: HOSPITALIST

## 2024-07-08 RX ORDER — GLUCAGON INJECTION, SOLUTION 1 MG/.2ML
1 INJECTION, SOLUTION SUBCUTANEOUS ONCE AS NEEDED
Qty: 0.2 ML | Refills: 1 | Status: SHIPPED | OUTPATIENT
Start: 2024-07-08

## 2024-07-08 RX ORDER — TIRZEPATIDE 7.5 MG/.5ML
7.5 INJECTION, SOLUTION SUBCUTANEOUS
Qty: 2 ML | Refills: 3 | Status: SHIPPED | OUTPATIENT
Start: 2024-07-08

## 2024-07-08 RX ORDER — IBUPROFEN 400 MG/1
TABLET ORAL
COMMUNITY
Start: 2024-05-16

## 2024-07-08 RX ORDER — FLUCONAZOLE 150 MG/1
TABLET ORAL
COMMUNITY
Start: 2024-06-26 | End: 2024-07-08 | Stop reason: ALTCHOICE

## 2024-07-08 RX ORDER — TIRZEPATIDE 7.5 MG/.5ML
7.5 INJECTION, SOLUTION SUBCUTANEOUS
Qty: 2 ML | Refills: 3 | Status: SHIPPED | OUTPATIENT
Start: 2024-07-08 | End: 2024-07-08 | Stop reason: SDUPTHER

## 2024-07-08 ASSESSMENT — ENCOUNTER SYMPTOMS
ABDOMINAL DISTENTION: 0
VOMITING: 0
EYE ITCHING: 0
ABDOMINAL PAIN: 0
NAUSEA: 0
DYSURIA: 0
DIARRHEA: 0
PHOTOPHOBIA: 0
PALPITATIONS: 0
SLEEP DISTURBANCE: 0
FREQUENCY: 0
CHEST TIGHTNESS: 0
AGITATION: 0
CONSTIPATION: 0
TREMORS: 0
HEADACHES: 0
LIGHT-HEADEDNESS: 0
ARTHRALGIAS: 0
TROUBLE SWALLOWING: 0
NERVOUS/ANXIOUS: 0
SORE THROAT: 0
VOICE CHANGE: 0
CONSTITUTIONAL NEGATIVE: 1
SHORTNESS OF BREATH: 0

## 2024-07-08 NOTE — PROGRESS NOTES
Subjective   Patient ID: Tessy Barone is a 49 y.o. female who presents for Diabetes (Dx DM1: 9/1992/PCP: Dr. Jovel /eye doctor: yearly/podiatry: does not see one//Failed: ozempic (GI upset), Omnipod 5 (hyperglycemia)//Patient testing glucose 4 times daily with cgm/due to fluctuating blood glucose, hypoglycemia and insulin pump management /Patient is adjusting insulin three times daily based on glucose readings and/or carb counting./ using tandum and dexcom-not linked. /4/26/2024 hga1c 6.9%/Breast reduction sx 5/2024; vacations also ).  Lab Results   Component Value Date    HGBA1C 6.5 07/08/2024      HPI    Review of Systems   Constitutional: Negative.    HENT:  Negative for sore throat, trouble swallowing and voice change.    Eyes:  Negative for photophobia, itching and visual disturbance.   Respiratory:  Negative for chest tightness and shortness of breath.    Cardiovascular:  Negative for chest pain and palpitations.   Gastrointestinal:  Negative for abdominal distention, abdominal pain, constipation, diarrhea, nausea and vomiting.   Endocrine: Negative for cold intolerance, heat intolerance and polyuria.   Genitourinary:  Negative for dysuria and frequency.   Musculoskeletal:  Negative for arthralgias.   Skin:  Negative for pallor.   Allergic/Immunologic: Negative for environmental allergies.   Neurological:  Negative for tremors, light-headedness and headaches.   Psychiatric/Behavioral:  Negative for agitation and sleep disturbance. The patient is not nervous/anxious.        Objective   Physical Exam  Constitutional:       Appearance: Normal appearance.   HENT:      Head: Normocephalic.      Nose: Nose normal.      Mouth/Throat:      Mouth: Mucous membranes are moist.   Eyes:      Extraocular Movements: Extraocular movements intact.   Cardiovascular:      Rate and Rhythm: Normal rate.   Pulmonary:      Effort: Pulmonary effort is normal. No respiratory distress.   Abdominal:      General: There is no  "distension.   Musculoskeletal:         General: Normal range of motion.      Cervical back: Normal range of motion and neck supple.   Skin:     General: Skin is warm and dry.   Neurological:      Mental Status: She is alert and oriented to person, place, and time.   Psychiatric:         Mood and Affect: Mood normal.      Visit Vitals  /79   Ht 1.676 m (5' 6\")   Wt 78.9 kg (174 lb)   BMI 28.08 kg/m²   Smoking Status Never Assessed   BSA 1.92 m²        Assessment/Plan   Diagnoses and all orders for this visit:  Type 1 diabetes mellitus with hyperglycemia, with long-term current use of insulin (Multi)  -     POCT glycosylated hemoglobin (Hb A1C) manually resulted  -     POCT glucose manually resulted  -     glucagon (Gvoke HypoPen 1-Pack) 1 mg/0.2 mL auto-injector; Inject 1 mg under the skin 1 time if needed (when sugar less than 70 and unable totake orally) for up to 1 dose.  -     tirzepatide (Mounjaro) 7.5 mg/0.5 mL pen injector; Inject 7.5 mg under the skin every 7 days.  Insulin pump in place  Hypertension, benign  Hyperlipidemia, unspecified hyperlipidemia type  Overweight (BMI 25.0-29.9)       T1 DM: A1c at goal     she is on tandem T slim and Dexcom G6 CGM   She is also on Mounjaro 5 mg weekly(due to shortage she has been taking 5 mg , she was on 7.5 mg in past)     Blood sugar improved a lot while on Mounjaro.  She has lost 15 pounds in total.  In past she did  have nausea, rare constipation, burping     She is not linked for her pump.    Downloaded and reviewed : control iq 97 % of time TIR 75 % low 0.7 %      A1c at goal.     In past she did enter some phantom carbs and sometimes after eating    Comfortable in carb  counting      TRIED OMNIPOD 5 PUMP FOR 1-2 M AND HAD A LOT OF ISSUES WITH HER BG.         PLAN :   -Her blood sugars have been better since couple of days.  She did had yeast infection 2 weeks ago.  A week ago her blood sugars were elevated a lot and she was entering phantom carbs to " decrease her blood sugar  -Given that that will since couple of days we will not change the pump settings.  She did change her ICR from 20 to15 few months ago.    -I sent 7.5 mg Mounjaro .  If not available due to shortage we can try 10 mg once weekly Mounjaro.  If any side effects we will cut back to 5 mg once weekly .  Discussed side effects again    -In past advised not to enter phantom carbs   -Did not discuss insulin pump failure plan  -Hypoglycemia management. Sent gvoke pen  -Microalbuminuria+, not on ACE/ARB      STOPPED statin  in past and it helped with her joint pain   Following with cardiology , on zetia now.       # obesity : BMI ~ 31 now ~ 28  started ozempic since jan 2023 - had SE even with lower doses. GI issues.   Sept 18 th - stopped ozempic due to GI issues.  Currently on Mounjaro-he has lost 15 pounds in total.  She does have nausea, rare constipation, burping  I sent 7.5 mg Mounjaro .  If not available due to shortage we can try 10 mg once weekly Mounjaro.  If any side effects we will cut back to 5 mg once weekly .  Discussed side effects again   in past discussed moderation and mindful eating and doing something that is sutainable ,       # Hyperlipidemia: LDL above goal  Following with cardiology      RTC 4,m       SH- she has 3 kids 18 yo boy ( T 1 DM dx4 yrs ago) , 19 yo daughtr and 12 yo daughter.   She is a teacher   Breast reduction surgery in past

## 2024-09-09 DIAGNOSIS — E11.65 TYPE 2 DIABETES MELLITUS WITH HYPERGLYCEMIA, WITH LONG-TERM CURRENT USE OF INSULIN (MULTI): ICD-10-CM

## 2024-09-09 DIAGNOSIS — Z79.4 TYPE 2 DIABETES MELLITUS WITH HYPERGLYCEMIA, WITH LONG-TERM CURRENT USE OF INSULIN (MULTI): ICD-10-CM

## 2024-09-09 RX ORDER — LISINOPRIL 10 MG/1
10 TABLET ORAL DAILY
Qty: 90 TABLET | Refills: 0 | Status: SHIPPED | OUTPATIENT
Start: 2024-09-09

## 2024-10-07 ENCOUNTER — LAB (OUTPATIENT)
Dept: LAB | Facility: LAB | Age: 49
End: 2024-10-07
Payer: COMMERCIAL

## 2024-10-07 ENCOUNTER — APPOINTMENT (OUTPATIENT)
Dept: ENDOCRINOLOGY | Facility: CLINIC | Age: 49
End: 2024-10-07
Payer: COMMERCIAL

## 2024-10-07 VITALS
WEIGHT: 168 LBS | DIASTOLIC BLOOD PRESSURE: 64 MMHG | HEIGHT: 66 IN | BODY MASS INDEX: 27 KG/M2 | SYSTOLIC BLOOD PRESSURE: 101 MMHG

## 2024-10-07 DIAGNOSIS — E10.65 TYPE 1 DIABETES MELLITUS WITH HYPERGLYCEMIA, WITH LONG-TERM CURRENT USE OF INSULIN: Primary | ICD-10-CM

## 2024-10-07 DIAGNOSIS — R41.3 MEMORY CHANGE: ICD-10-CM

## 2024-10-07 DIAGNOSIS — E78.5 HYPERLIPIDEMIA, UNSPECIFIED HYPERLIPIDEMIA TYPE: ICD-10-CM

## 2024-10-07 DIAGNOSIS — Z96.41 INSULIN PUMP IN PLACE: ICD-10-CM

## 2024-10-07 LAB
ALBUMIN SERPL BCP-MCNC: 3.9 G/DL (ref 3.4–5)
ALP SERPL-CCNC: 49 U/L (ref 33–110)
ALT SERPL W P-5'-P-CCNC: 7 U/L (ref 7–45)
ANION GAP SERPL CALC-SCNC: 12 MMOL/L (ref 10–20)
AST SERPL W P-5'-P-CCNC: 11 U/L (ref 9–39)
BILIRUB SERPL-MCNC: 0.8 MG/DL (ref 0–1.2)
BUN SERPL-MCNC: 10 MG/DL (ref 6–23)
CALCIUM SERPL-MCNC: 8.8 MG/DL (ref 8.6–10.6)
CHLORIDE SERPL-SCNC: 102 MMOL/L (ref 98–107)
CO2 SERPL-SCNC: 27 MMOL/L (ref 21–32)
CREAT SERPL-MCNC: 0.6 MG/DL (ref 0.5–1.05)
EGFRCR SERPLBLD CKD-EPI 2021: >90 ML/MIN/1.73M*2
ERYTHROCYTE [DISTWIDTH] IN BLOOD BY AUTOMATED COUNT: 12.8 % (ref 11.5–14.5)
ERYTHROCYTE [SEDIMENTATION RATE] IN BLOOD BY WESTERGREN METHOD: 10 MM/H (ref 0–30)
GLUCOSE SERPL-MCNC: 173 MG/DL (ref 74–99)
HCT VFR BLD AUTO: 39.1 % (ref 36–46)
HGB BLD-MCNC: 12.6 G/DL (ref 12–16)
MCH RBC QN AUTO: 29.4 PG (ref 26–34)
MCHC RBC AUTO-ENTMCNC: 32.2 G/DL (ref 32–36)
MCV RBC AUTO: 91 FL (ref 80–100)
NRBC BLD-RTO: 0 /100 WBCS (ref 0–0)
PLATELET # BLD AUTO: 276 X10*3/UL (ref 150–450)
POC FINGERSTICK BLOOD GLUCOSE: 168 MG/DL (ref 70–100)
POC HEMOGLOBIN A1C: 7.4 % (ref 4.2–6.5)
POTASSIUM SERPL-SCNC: 4.1 MMOL/L (ref 3.5–5.3)
PROT SERPL-MCNC: 6.8 G/DL (ref 6.4–8.2)
RBC # BLD AUTO: 4.29 X10*6/UL (ref 4–5.2)
SODIUM SERPL-SCNC: 137 MMOL/L (ref 136–145)
TSH SERPL-ACNC: 2.38 MIU/L (ref 0.44–3.98)
WBC # BLD AUTO: 6.3 X10*3/UL (ref 4.4–11.3)

## 2024-10-07 PROCEDURE — 99214 OFFICE O/P EST MOD 30 MIN: CPT | Performed by: HOSPITALIST

## 2024-10-07 PROCEDURE — 3078F DIAST BP <80 MM HG: CPT | Performed by: HOSPITALIST

## 2024-10-07 PROCEDURE — 82962 GLUCOSE BLOOD TEST: CPT | Performed by: HOSPITALIST

## 2024-10-07 PROCEDURE — 3074F SYST BP LT 130 MM HG: CPT | Performed by: HOSPITALIST

## 2024-10-07 PROCEDURE — 83036 HEMOGLOBIN GLYCOSYLATED A1C: CPT | Performed by: HOSPITALIST

## 2024-10-07 PROCEDURE — 3008F BODY MASS INDEX DOCD: CPT | Performed by: HOSPITALIST

## 2024-10-07 PROCEDURE — 3060F POS MICROALBUMINURIA REV: CPT | Performed by: HOSPITALIST

## 2024-10-07 PROCEDURE — 85027 COMPLETE CBC AUTOMATED: CPT

## 2024-10-07 PROCEDURE — 84443 ASSAY THYROID STIM HORMONE: CPT

## 2024-10-07 PROCEDURE — 3044F HG A1C LEVEL LT 7.0%: CPT | Performed by: HOSPITALIST

## 2024-10-07 PROCEDURE — 86038 ANTINUCLEAR ANTIBODIES: CPT

## 2024-10-07 PROCEDURE — 3049F LDL-C 100-129 MG/DL: CPT | Performed by: HOSPITALIST

## 2024-10-07 PROCEDURE — 80053 COMPREHEN METABOLIC PANEL: CPT

## 2024-10-07 PROCEDURE — 36415 COLL VENOUS BLD VENIPUNCTURE: CPT

## 2024-10-07 PROCEDURE — 85652 RBC SED RATE AUTOMATED: CPT

## 2024-10-07 PROCEDURE — 4010F ACE/ARB THERAPY RXD/TAKEN: CPT | Performed by: HOSPITALIST

## 2024-10-07 ASSESSMENT — ENCOUNTER SYMPTOMS
VOICE CHANGE: 0
PHOTOPHOBIA: 0
HEADACHES: 0
ARTHRALGIAS: 0
DIARRHEA: 0
CONSTITUTIONAL NEGATIVE: 1
DYSURIA: 0
ABDOMINAL DISTENTION: 0
PALPITATIONS: 0
AGITATION: 0
VOMITING: 0
CONSTIPATION: 0
LIGHT-HEADEDNESS: 0
NAUSEA: 0
NERVOUS/ANXIOUS: 0
SHORTNESS OF BREATH: 0
SLEEP DISTURBANCE: 0
TROUBLE SWALLOWING: 0
ABDOMINAL PAIN: 0
TREMORS: 0
SORE THROAT: 0
FREQUENCY: 0
EYE ITCHING: 0
CHEST TIGHTNESS: 0

## 2024-10-07 NOTE — PROGRESS NOTES
Subjective   Patient ID: Tessy Barone is a 49 y.o. female who presents for Diabetes (Dx DM1: 9/1992/PCP: Dr. Jovel /eye doctor: yearly/podiatry: does not see one//Failed: ozempic (GI upset), Omnipod 5 (hyperglycemia)//Patient testing glucose 4 times daily with cgm/due to fluctuating blood glucose, hypoglycemia and insulin pump management /Patient is adjusting insulin three times daily based on glucose readings and/or carb counting./ using tandum and dexcom-not linked./7/8/2024 hga1c 6.5%).  Lab Results   Component Value Date    HGBA1C 7.4 (A) 10/07/2024      HPI   See AP  Review of Systems   Constitutional: Negative.    HENT:  Negative for sore throat, trouble swallowing and voice change.    Eyes:  Negative for photophobia, itching and visual disturbance.   Respiratory:  Negative for chest tightness and shortness of breath.    Cardiovascular:  Negative for chest pain and palpitations.   Gastrointestinal:  Negative for abdominal distention, abdominal pain, constipation, diarrhea, nausea and vomiting.   Endocrine: Negative for cold intolerance, heat intolerance and polyuria.   Genitourinary:  Negative for dysuria and frequency.   Musculoskeletal:  Negative for arthralgias.   Skin:  Negative for pallor.   Allergic/Immunologic: Negative for environmental allergies.   Neurological:  Negative for tremors, light-headedness and headaches.   Psychiatric/Behavioral:  Negative for agitation and sleep disturbance. The patient is not nervous/anxious.        Objective   Physical Exam  Constitutional:       Appearance: Normal appearance.   HENT:      Head: Normocephalic.      Nose: Nose normal.      Mouth/Throat:      Mouth: Mucous membranes are moist.   Eyes:      Extraocular Movements: Extraocular movements intact.   Cardiovascular:      Rate and Rhythm: Normal rate.   Pulmonary:      Effort: Pulmonary effort is normal. No respiratory distress.   Abdominal:      General: There is no distension.   Musculoskeletal:          "General: Normal range of motion.      Cervical back: Normal range of motion and neck supple.   Skin:     General: Skin is warm and dry.   Neurological:      Mental Status: She is alert and oriented to person, place, and time.   Psychiatric:         Mood and Affect: Mood normal.    Visit Vitals  /64   Ht 1.676 m (5' 6\")   Wt 76.2 kg (168 lb)   BMI 27.12 kg/m²   Smoking Status Never Assessed   BSA 1.88 m²        Assessment/Plan   Diagnoses and all orders for this visit:  Type 1 diabetes mellitus with hyperglycemia, with long-term current use of insulin  -     POCT glycosylated hemoglobin (Hb A1C) manually resulted  -     POCT glucose manually resulted  Memory change  -     TSH with reflex to Free T4 if abnormal; Future  -     DAGOBERTO with Reflex to CAM; Future  -     Comprehensive metabolic panel; Future  -     Sedimentation rate, automated (Erythrocyte); Future  -     CBC; Future  Insulin pump in place  Hyperlipidemia, unspecified hyperlipidemia type       T1 DM: A1c at goal     she is on tandem T slim and Dexcom G6 CGM   She is also on Mounjaro 7.5  mg weekly( low appetite , feels choudhary )        Blood sugar improved a lot while on Mounjaro.  She has lost 15 pounds in total.  In past she did  have nausea, rare constipation, burping   BG lower , dropping a lot after correction . Not entering all the carbs given afraid of going low after eating  Downloaded and reviewed : control iq 96 % of time TIR 69 % low 0.7 %      A1c at goal.     In past she did enter some phantom carbs and sometimes after eating    Comfortable in carb  counting      TRIED OMNIPOD 5 PUMP FOR 1-2 M AND HAD A LOT OF ISSUES WITH HER BG.         PLAN : \ DECREASE correction factor to 60  from 50,   - will  NOT INCREASE mounjaro dose given low appetite at this time       -In past advised not to enter phantom carbs   -Did not discuss insulin pump failure plan  -Hypoglycemia management. Sent gvoke pen  -Microalbuminuria+, not on ACE/ARB      STOPPED " statin  in past and it helped with her joint pain   Following with cardiology , on zetia now.       # obesity : BMI ~ 31 now ~ 28  started ozempic since jan 2023 - had SE even with lower doses. GI issues.   Sept 18 th - stopped ozempic due to GI issues.  Currently on Mounjaro-he has lost 15 pounds in total.  She does have rare constipation, burping  - will  NOT INCREASE mounjaro dose given low appetite at this time   I sent 7.5 mg Mounjaro .  If not available due to shortage we can try 10 mg once weekly Mounjaro.  If any side effects we will cut back to 5 mg once weekly .  Discussed side effects again   in past discussed moderation and mindful eating and doing something that is sutainable ,       # Hyperlipidemia: LDL above goal  Following with cardiology     # memory issue : repeat TFT , if normal follow PCP or neurology       RTC 4,m       SH- she has 3 kids 20 yo boy ( T 1 DM dx4 yrs ago) , 19 yo daughtr and 12 yo daughter.   She is a teacher   Breast reduction surgery in past

## 2024-10-08 LAB — ANA SER QL HEP2 SUBST: NEGATIVE

## 2024-11-13 DIAGNOSIS — E10.65 TYPE 1 DIABETES MELLITUS WITH HYPERGLYCEMIA, WITH LONG-TERM CURRENT USE OF INSULIN: ICD-10-CM

## 2024-11-13 RX ORDER — TIRZEPATIDE 7.5 MG/.5ML
7.5 INJECTION, SOLUTION SUBCUTANEOUS
Qty: 2 ML | Refills: 4 | Status: SHIPPED | OUTPATIENT
Start: 2024-11-17

## 2024-11-22 DIAGNOSIS — E10.65 TYPE 1 DIABETES MELLITUS WITH HYPERGLYCEMIA, WITH LONG-TERM CURRENT USE OF INSULIN: ICD-10-CM

## 2024-11-22 RX ORDER — TIRZEPATIDE 10 MG/.5ML
10 INJECTION, SOLUTION SUBCUTANEOUS
Qty: 6 ML | Refills: 1 | Status: SHIPPED | OUTPATIENT
Start: 2024-11-22

## 2024-12-13 DIAGNOSIS — Z79.4 TYPE 2 DIABETES MELLITUS WITH HYPERGLYCEMIA, WITH LONG-TERM CURRENT USE OF INSULIN: ICD-10-CM

## 2024-12-13 DIAGNOSIS — E11.65 TYPE 2 DIABETES MELLITUS WITH HYPERGLYCEMIA, WITH LONG-TERM CURRENT USE OF INSULIN: ICD-10-CM

## 2024-12-13 RX ORDER — LISINOPRIL 10 MG/1
10 TABLET ORAL DAILY
Qty: 90 TABLET | Refills: 1 | Status: SHIPPED | OUTPATIENT
Start: 2024-12-13

## 2025-01-16 DIAGNOSIS — E10.65 TYPE 1 DIABETES MELLITUS WITH HYPERGLYCEMIA, WITH LONG-TERM CURRENT USE OF INSULIN: Primary | ICD-10-CM

## 2025-01-16 RX ORDER — BLOOD-GLUCOSE METER
EACH MISCELLANEOUS
Qty: 200 EACH | Refills: 2 | Status: SHIPPED | OUTPATIENT
Start: 2025-01-16

## 2025-01-16 RX ORDER — INSULIN GLARGINE 100 [IU]/ML
INJECTION, SOLUTION SUBCUTANEOUS
Qty: 30 ML | Refills: 1 | Status: SHIPPED | OUTPATIENT
Start: 2025-01-16

## 2025-01-16 RX ORDER — INSULIN ASPART 100 [IU]/ML
INJECTION, SOLUTION INTRAVENOUS; SUBCUTANEOUS
Qty: 30 ML | Refills: 1 | Status: SHIPPED | OUTPATIENT
Start: 2025-01-16 | End: 2025-01-17 | Stop reason: CLARIF

## 2025-01-17 ENCOUNTER — TELEPHONE (OUTPATIENT)
Dept: ENDOCRINOLOGY | Facility: CLINIC | Age: 50
End: 2025-01-17
Payer: COMMERCIAL

## 2025-01-17 DIAGNOSIS — E10.65 TYPE 1 DIABETES MELLITUS WITH HYPERGLYCEMIA, WITH LONG-TERM CURRENT USE OF INSULIN: ICD-10-CM

## 2025-01-17 RX ORDER — INSULIN ASPART 100 [IU]/ML
INJECTION, SOLUTION INTRAVENOUS; SUBCUTANEOUS
Qty: 30 ML | Refills: 1 | Status: SHIPPED | OUTPATIENT
Start: 2025-01-17

## 2025-01-17 NOTE — PROGRESS NOTES
Insulin pump failed at 8 : 40 pm    Sent lantus 17 units every 24 hrs . Novolog 1 : 20 ICR and 1: 50 > 150 correction     now , no n/v , feels fine , 4 units novolog for correction    If not feeling well overnight advised to go to ER     Reconnect pump 24 hrs after long acting insulin.

## 2025-02-05 ENCOUNTER — APPOINTMENT (OUTPATIENT)
Dept: ENDOCRINOLOGY | Facility: CLINIC | Age: 50
End: 2025-02-05
Payer: COMMERCIAL

## 2025-02-05 VITALS
HEIGHT: 66 IN | WEIGHT: 158 LBS | SYSTOLIC BLOOD PRESSURE: 116 MMHG | DIASTOLIC BLOOD PRESSURE: 78 MMHG | BODY MASS INDEX: 25.39 KG/M2

## 2025-02-05 DIAGNOSIS — Z96.41 INSULIN PUMP IN PLACE: ICD-10-CM

## 2025-02-05 DIAGNOSIS — I10 HYPERTENSION, BENIGN: ICD-10-CM

## 2025-02-05 DIAGNOSIS — E78.5 HYPERLIPIDEMIA, UNSPECIFIED HYPERLIPIDEMIA TYPE: ICD-10-CM

## 2025-02-05 DIAGNOSIS — E10.65 TYPE 1 DIABETES MELLITUS WITH HYPERGLYCEMIA, WITH LONG-TERM CURRENT USE OF INSULIN: Primary | ICD-10-CM

## 2025-02-05 LAB
POC FINGERSTICK BLOOD GLUCOSE: 145 MG/DL (ref 70–100)
POC HEMOGLOBIN A1C: 7.3 % (ref 4.2–6.5)

## 2025-02-05 PROCEDURE — 82962 GLUCOSE BLOOD TEST: CPT | Performed by: HOSPITALIST

## 2025-02-05 PROCEDURE — 99214 OFFICE O/P EST MOD 30 MIN: CPT | Performed by: HOSPITALIST

## 2025-02-05 PROCEDURE — 4010F ACE/ARB THERAPY RXD/TAKEN: CPT | Performed by: HOSPITALIST

## 2025-02-05 PROCEDURE — 3078F DIAST BP <80 MM HG: CPT | Performed by: HOSPITALIST

## 2025-02-05 PROCEDURE — 3008F BODY MASS INDEX DOCD: CPT | Performed by: HOSPITALIST

## 2025-02-05 PROCEDURE — 83036 HEMOGLOBIN GLYCOSYLATED A1C: CPT | Performed by: HOSPITALIST

## 2025-02-05 PROCEDURE — 3074F SYST BP LT 130 MM HG: CPT | Performed by: HOSPITALIST

## 2025-02-05 RX ORDER — TIRZEPATIDE 7.5 MG/.5ML
7.5 INJECTION, SOLUTION SUBCUTANEOUS
Qty: 2 ML | Refills: 4 | Status: SHIPPED | OUTPATIENT
Start: 2025-02-09

## 2025-02-05 ASSESSMENT — ENCOUNTER SYMPTOMS
FREQUENCY: 0
LIGHT-HEADEDNESS: 0
PALPITATIONS: 0
CONSTIPATION: 0
CONSTITUTIONAL NEGATIVE: 1
PHOTOPHOBIA: 0
NAUSEA: 0
DYSURIA: 0
EYE ITCHING: 0
SLEEP DISTURBANCE: 0
AGITATION: 0
ARTHRALGIAS: 0
ABDOMINAL DISTENTION: 0
HEADACHES: 0
VOMITING: 0
ABDOMINAL PAIN: 0
TROUBLE SWALLOWING: 0
CHEST TIGHTNESS: 0
SHORTNESS OF BREATH: 0
NERVOUS/ANXIOUS: 0
SORE THROAT: 0
TREMORS: 0
DIARRHEA: 0
VOICE CHANGE: 0

## 2025-02-05 NOTE — PROGRESS NOTES
Subjective   Patient ID: Tessy Barone is a 49 y.o. female who presents for Diabetes ((Dx DM1: 9/1992/PCP: Dr. Jovel /eye doctor: yearly/podiatry: does not see one//Failed: ozempic (GI upset), Omnipod 5 (hyperglycemia)//Patient testing glucose 4 times daily with cgm/due to fluctuating blood glucose, hypoglycemia and insulin pump management /Patient is adjusting insulin three times daily based on glucose readings and/or carb counting./ using tandum and dexcom-not linked/10/7/2024 hga1c 7.4%/Alternates mounjaro- needs to discuss ).  Lab Results   Component Value Date    HGBA1C 7.3 (A) 02/05/2025      HPI   See AP     Review of Systems   Constitutional: Negative.    HENT:  Negative for sore throat, trouble swallowing and voice change.    Eyes:  Negative for photophobia, itching and visual disturbance.   Respiratory:  Negative for chest tightness and shortness of breath.    Cardiovascular:  Negative for chest pain and palpitations.   Gastrointestinal:  Negative for abdominal distention, abdominal pain, constipation, diarrhea, nausea and vomiting.   Endocrine: Negative for cold intolerance, heat intolerance and polyuria.   Genitourinary:  Negative for dysuria and frequency.   Musculoskeletal:  Negative for arthralgias.   Skin:  Negative for pallor.   Allergic/Immunologic: Negative for environmental allergies.   Neurological:  Negative for tremors, light-headedness and headaches.   Psychiatric/Behavioral:  Negative for agitation and sleep disturbance. The patient is not nervous/anxious.        Objective   Physical Exam  Constitutional:       Appearance: Normal appearance.   HENT:      Head: Normocephalic.      Nose: Nose normal.      Mouth/Throat:      Mouth: Mucous membranes are moist.   Eyes:      Extraocular Movements: Extraocular movements intact.   Cardiovascular:      Rate and Rhythm: Normal rate.   Pulmonary:      Effort: Pulmonary effort is normal. No respiratory distress.   Abdominal:      General: There is  "no distension.   Musculoskeletal:         General: Normal range of motion.      Cervical back: Normal range of motion and neck supple.   Skin:     General: Skin is warm and dry.   Neurological:      Mental Status: She is alert and oriented to person, place, and time.   Psychiatric:         Mood and Affect: Mood normal.      Visit Vitals  /78   Ht 1.676 m (5' 6\")   Wt 71.7 kg (158 lb)   BMI 25.50 kg/m²   Smoking Status Never Assessed   BSA 1.83 m²        Assessment/Plan   Diagnoses and all orders for this visit:  Type 1 diabetes mellitus with hyperglycemia, with long-term current use of insulin  -     POCT glucose manually resulted  -     POCT glycosylated hemoglobin (Hb A1C) manually resulted  -     tirzepatide (Mounjaro) 7.5 mg/0.5 mL pen injector; Inject 7.5 mg under the skin 1 (one) time per week.  -     Comprehensive metabolic panel; Future  -     Hemoglobin A1c; Future  -     Lipid Panel; Future  -     Albumin-Creatinine Ratio, Urine Random; Future  Insulin pump in place  Hypertension, benign  Hyperlipidemia, unspecified hyperlipidemia type            T1 DM: A1c at goal     she is on tandem T slim and Dexcom G6 CGM   She is also on Mounjaro 10 mg weekly(  mild GI SE , was feeling better on 7.5 mg once weekly )        Blood sugar improved a lot while on Mounjaro.  She has lost  ~ 30  pounds in total.  In past she did  have nausea, rare constipation, burping   BG lower , dropping a lot after correction .   Downloaded and reviewed : control iq 52% of time TIR 69 % low 0.7 %      A1c at goal.     In past she did enter some phantom carbs and sometimes after eating    Comfortable in carb  counting      TRIED OMNIPOD 5 PUMP FOR 1-2 M AND HAD A LOT OF ISSUES WITH HER BG.       Interval h/o : pump broke and was on basal bolus regimen through insulin pens for 4 days    Put her settings herself in pump and the max daily she entered as 25 instead of 45 units a day         PLAN :   She put her settings herself    - " changed carb ratio to 10 from 12 given high BG after eating   - added basal rate 0.6 from 5 pm  - 10 pm   - advised to use sleep mode for 4 hrs after changing set given high BG whenever she changes it for few hrs   - change sites often   - DECREASE mounajro to 7.5 mg once weekly given mild GI SE from 10 mg dose. Discussed SE in detail   - add resistant exercises         -In past advised not to enter phantom carbs   -Did not discuss insulin pump failure plan  -Hypoglycemia management. Sent gvoke pen  -Microalbuminuria+, not on ACE/ARB      STOPPED statin  in past and it helped with her joint pain   Following with cardiology , on zetia now.       # obesity : BMI ~ 31 now ~ 28  started ozempic since jan 2023 - had SE even with lower doses. GI issues.   Sept 18 th - stopped ozempic due to GI issues.  Currently on Mounjaro-he has lost ~30 pounds in total.  She does have rare constipation, burping   DECREASE mounajro to 7.5 mg once weekly given mild GI SE from 10 mg dose. Discussed SE in detail   Discussed side effects again   in past discussed moderation and mindful eating and doing something that is sutainable ,       # Hyperlipidemia: LDL above goal  Following with cardiology        RTC 4,m       SH- she has 3 kids 20 yo boy ( T 1 DM dx4 yrs ago) , 19 yo daughtr and 14 yo daughter.   She is a teacher   Breast reduction surgery in past

## 2025-03-12 DIAGNOSIS — E10.65 TYPE 1 DIABETES MELLITUS WITH HYPERGLYCEMIA, WITH LONG-TERM CURRENT USE OF INSULIN: ICD-10-CM

## 2025-03-12 RX ORDER — INSULIN ASPART 100 [IU]/ML
INJECTION, SOLUTION INTRAVENOUS; SUBCUTANEOUS
Qty: 30 ML | Refills: 1 | Status: SHIPPED | OUTPATIENT
Start: 2025-03-12

## 2025-03-12 RX ORDER — INSULIN GLARGINE 100 [IU]/ML
INJECTION, SOLUTION SUBCUTANEOUS
Qty: 30 ML | Refills: 1 | Status: SHIPPED | OUTPATIENT
Start: 2025-03-12

## 2025-03-13 DIAGNOSIS — E10.65 TYPE 1 DIABETES MELLITUS WITH HYPERGLYCEMIA, WITH LONG-TERM CURRENT USE OF INSULIN: ICD-10-CM

## 2025-03-13 RX ORDER — INSULIN ASPART 100 [IU]/ML
INJECTION, SOLUTION INTRAVENOUS; SUBCUTANEOUS
Qty: 90 ML | Refills: 1 | Status: SHIPPED | OUTPATIENT
Start: 2025-03-13

## 2025-03-17 ENCOUNTER — TELEPHONE (OUTPATIENT)
Dept: ENDOCRINOLOGY | Facility: CLINIC | Age: 50
End: 2025-03-17
Payer: COMMERCIAL

## 2025-03-17 DIAGNOSIS — E10.65 TYPE 1 DIABETES MELLITUS WITH HYPERGLYCEMIA, WITH LONG-TERM CURRENT USE OF INSULIN: ICD-10-CM

## 2025-03-17 RX ORDER — BLOOD-GLUCOSE METER
EACH MISCELLANEOUS
Qty: 300 EACH | Refills: 1 | Status: SHIPPED | OUTPATIENT
Start: 2025-03-17

## 2025-03-17 NOTE — TELEPHONE ENCOUNTER
Would it be possible for you to please put in a prescription for some One Touch Verio test stripes to the [facility] pharmacy in [place] on [address]? I thought I had one more bottle of them, but just realized that I do not.

## 2025-03-18 ENCOUNTER — TELEPHONE (OUTPATIENT)
Dept: ENDOCRINOLOGY | Facility: CLINIC | Age: 50
End: 2025-03-18
Payer: COMMERCIAL

## 2025-03-18 DIAGNOSIS — E10.65 TYPE 1 DIABETES MELLITUS WITH HYPERGLYCEMIA, WITH LONG-TERM CURRENT USE OF INSULIN: Primary | ICD-10-CM

## 2025-03-18 RX ORDER — PEN NEEDLE, DIABETIC 30 GX3/16"
NEEDLE, DISPOSABLE MISCELLANEOUS
Qty: 200 EACH | Refills: 3 | Status: SHIPPED | OUTPATIENT
Start: 2025-03-18 | End: 2026-03-18

## 2025-03-18 NOTE — TELEPHONE ENCOUNTER
Patient called in and is requesting a  rx. For 8mm Ultra fine short pen   [place](wants to try this size) [facility].    ASAP leaving for vacation tomorrow am.

## 2025-03-23 DIAGNOSIS — E10.65 TYPE 1 DIABETES MELLITUS WITH HYPERGLYCEMIA, WITH LONG-TERM CURRENT USE OF INSULIN: ICD-10-CM

## 2025-03-25 RX ORDER — OMEPRAZOLE 20 MG/1
20 CAPSULE, DELAYED RELEASE ORAL DAILY
Qty: 90 CAPSULE | Refills: 1 | Status: SHIPPED | OUTPATIENT
Start: 2025-03-25

## 2025-05-15 DIAGNOSIS — E10.65 TYPE 1 DIABETES MELLITUS WITH HYPERGLYCEMIA, WITH LONG-TERM CURRENT USE OF INSULIN: ICD-10-CM

## 2025-06-11 DIAGNOSIS — Z79.4 TYPE 2 DIABETES MELLITUS WITH HYPERGLYCEMIA, WITH LONG-TERM CURRENT USE OF INSULIN: ICD-10-CM

## 2025-06-11 DIAGNOSIS — E11.65 TYPE 2 DIABETES MELLITUS WITH HYPERGLYCEMIA, WITH LONG-TERM CURRENT USE OF INSULIN: ICD-10-CM

## 2025-06-11 RX ORDER — LISINOPRIL 10 MG/1
10 TABLET ORAL DAILY
Qty: 90 TABLET | Refills: 1 | Status: SHIPPED | OUTPATIENT
Start: 2025-06-11 | End: 2025-06-12 | Stop reason: WASHOUT

## 2025-06-11 RX ORDER — METOPROLOL SUCCINATE 25 MG/1
TABLET, EXTENDED RELEASE ORAL
COMMUNITY
Start: 2025-04-21

## 2025-06-12 ENCOUNTER — APPOINTMENT (OUTPATIENT)
Dept: ENDOCRINOLOGY | Facility: CLINIC | Age: 50
End: 2025-06-12
Payer: COMMERCIAL

## 2025-06-12 VITALS
SYSTOLIC BLOOD PRESSURE: 88 MMHG | BODY MASS INDEX: 25.07 KG/M2 | DIASTOLIC BLOOD PRESSURE: 58 MMHG | WEIGHT: 156 LBS | HEIGHT: 66 IN

## 2025-06-12 DIAGNOSIS — E78.5 HYPERLIPIDEMIA, UNSPECIFIED HYPERLIPIDEMIA TYPE: ICD-10-CM

## 2025-06-12 DIAGNOSIS — I10 HYPERTENSION, BENIGN: ICD-10-CM

## 2025-06-12 DIAGNOSIS — E10.65 TYPE 1 DIABETES MELLITUS WITH HYPERGLYCEMIA, WITH LONG-TERM CURRENT USE OF INSULIN: Primary | ICD-10-CM

## 2025-06-12 LAB
POC FINGERSTICK BLOOD GLUCOSE: 125 MG/DL (ref 70–100)
POC HEMOGLOBIN A1C: 7.3 % (ref 4.2–6.5)

## 2025-06-12 PROCEDURE — 3051F HG A1C>EQUAL 7.0%<8.0%: CPT | Performed by: HOSPITALIST

## 2025-06-12 PROCEDURE — 3074F SYST BP LT 130 MM HG: CPT | Performed by: HOSPITALIST

## 2025-06-12 PROCEDURE — 3078F DIAST BP <80 MM HG: CPT | Performed by: HOSPITALIST

## 2025-06-12 PROCEDURE — 82962 GLUCOSE BLOOD TEST: CPT | Performed by: HOSPITALIST

## 2025-06-12 PROCEDURE — 3008F BODY MASS INDEX DOCD: CPT | Performed by: HOSPITALIST

## 2025-06-12 PROCEDURE — 83036 HEMOGLOBIN GLYCOSYLATED A1C: CPT | Performed by: HOSPITALIST

## 2025-06-12 PROCEDURE — 99214 OFFICE O/P EST MOD 30 MIN: CPT | Performed by: HOSPITALIST

## 2025-06-12 PROCEDURE — 4010F ACE/ARB THERAPY RXD/TAKEN: CPT | Performed by: HOSPITALIST

## 2025-06-12 RX ORDER — LISINOPRIL 5 MG/1
5 TABLET ORAL DAILY
Qty: 90 TABLET | Refills: 2 | Status: SHIPPED | OUTPATIENT
Start: 2025-06-12 | End: 2026-03-09

## 2025-06-12 RX ORDER — LISINOPRIL 5 MG/1
5 TABLET ORAL DAILY
Qty: 90 TABLET | Refills: 2 | Status: SHIPPED | OUTPATIENT
Start: 2025-06-12 | End: 2025-06-12 | Stop reason: SDUPTHER

## 2025-06-12 ASSESSMENT — ENCOUNTER SYMPTOMS
CONSTIPATION: 0
AGITATION: 0
ABDOMINAL PAIN: 0
DIARRHEA: 0
ARTHRALGIAS: 0
FREQUENCY: 0
LIGHT-HEADEDNESS: 0
VOICE CHANGE: 0
TREMORS: 0
SHORTNESS OF BREATH: 0
SLEEP DISTURBANCE: 0
NAUSEA: 0
PHOTOPHOBIA: 0
HEADACHES: 0
VOMITING: 0
PALPITATIONS: 0
DYSURIA: 0
FATIGUE: 1
EYE ITCHING: 0
SORE THROAT: 0
NERVOUS/ANXIOUS: 0
ABDOMINAL DISTENTION: 0
TROUBLE SWALLOWING: 0
CHEST TIGHTNESS: 0

## 2025-06-12 NOTE — PROGRESS NOTES
Subjective   Patient ID: cielo ID: Tessy Barone is a 49 y.o. female who presents for Diabetes ((Dx DM1: 9/1992/PCP: Dr. Jovel /eye doctor: yearly/podiatry: does not see one//Failed: ozempic (GI upset), Omnipod 5 (hyperglycemia)//Patient testing glucose 4 times daily with cgm/due to fluctuating blood glucose, hypoglycemia and insulin pump management /Patient is adjusting insulin three times daily based on glucose readings and/or carb counting./ using tandum and dexcom-not linked  Lab Results   Component Value Date    HGBA1C 7.3 (A) 06/12/2025      HPI   See AP     Review of Systems   Constitutional:  Positive for fatigue.   HENT:  Negative for sore throat, trouble swallowing and voice change.    Eyes:  Negative for photophobia, itching and visual disturbance.   Respiratory:  Negative for chest tightness and shortness of breath.    Cardiovascular:  Negative for chest pain and palpitations.   Gastrointestinal:  Negative for abdominal distention, abdominal pain, constipation, diarrhea, nausea and vomiting.   Endocrine: Negative for cold intolerance, heat intolerance and polyuria.   Genitourinary:  Negative for dysuria and frequency.   Musculoskeletal:  Negative for arthralgias.   Skin:  Negative for pallor.   Allergic/Immunologic: Negative for environmental allergies.   Neurological:  Negative for tremors, light-headedness and headaches.   Psychiatric/Behavioral:  Negative for agitation and sleep disturbance. The patient is not nervous/anxious.        Objective   Physical Exam  Constitutional:       Appearance: Normal appearance.   HENT:      Head: Normocephalic.      Nose: Nose normal.      Mouth/Throat:      Mouth: Mucous membranes are moist.   Eyes:      Extraocular Movements: Extraocular movements intact.   Cardiovascular:      Rate and Rhythm: Normal rate.   Pulmonary:      Effort: Pulmonary effort is normal. No respiratory distress.   Abdominal:      General: There is no distension.   Musculoskeletal:          "General: Normal range of motion.      Cervical back: Normal range of motion and neck supple.   Skin:     General: Skin is warm and dry.   Neurological:      Mental Status: She is alert and oriented to person, place, and time.   Psychiatric:         Mood and Affect: Mood normal.    Visit Vitals  BP 88/58   Ht 1.676 m (5' 6\")   Wt 70.8 kg (156 lb)   BMI 25.18 kg/m²   Smoking Status Never Assessed   BSA 1.82 m²        Assessment/Plan   Diagnoses and all orders for this visit:  Type 1 diabetes mellitus with hyperglycemia, with long-term current use of insulin  -     POCT glucose manually resulted  -     POCT glycosylated hemoglobin (Hb A1C) manually resulted  -     tirzepatide (Mounjaro) 7.5 mg/0.5 mL pen injector; Inject 7.5 mg under the skin 1 (one) time per week.  -     Comprehensive metabolic panel; Future  -     Hemoglobin A1c; Future  -     Lipid Panel; Future  -     Albumin-Creatinine Ratio, Urine Random; Future  Insulin pump in place  Hypertension, benign  Hyperlipidemia, unspecified hyperlipidemia type            T1 DM: A1c at goal     she is on tandem T slim and Dexcom G6 CGM   She is also on Mounjaro 7.5 mg weekly(had side effects on 10 mg once weekly)        Blood sugar improved a lot while on Mounjaro.  She has lost  ~ 30  pounds in total.  In past she did  have nausea, rare constipation, burping   BG lower , dropping a lot after correction .   Downloaded and reviewed : control iq 97 % of time TIR 58 % low 0.4%      A1c at goal.     In past she did enter some phantom carbs and sometimes after eating    Comfortable in carb  counting      TRIED OMNIPOD 5 PUMP FOR 1-2 M AND HAD A LOT OF ISSUES WITH HER BG.       Interval h/o : pump broke and was on basal bolus regimen through insulin pens for 4 days    Put her settings herself in pump and the max daily she entered as 25 instead of 45 units a day         PLAN : Changed basal rate  By 0.1 unit/h extra from 9 AM to 10 PM.  Demonstrated use of extended bolus  - " advised to use sleep mode at night  - change sites often   - Continue mounajro 7.5 mg once weekly  - resistant exercises         -In past advised not to enter phantom carbs   -Hypoglycemia management. Sent gvoke pen  -Microalbuminuria+, on ACE inhibitor      STOPPED statin  in past and it helped with her joint pain   Following with cardiology , on zetia now.       # obesity : BMI ~ 31 now ~ 28  started ozempic since jan 2023 - had SE even with lower doses. GI issues.   Sept 18 th - stopped ozempic due to GI issues.  Currently on Mounjaro-he has lost ~30 pounds in total.   Currently on 7.5 mg once weekly Mounjaro doing well  Discussed side effects again   in past discussed moderation and mindful eating and doing something that is sutainable ,       # Hyperlipidemia: LDL above goal  Following with cardiology    # Hypertension-blood pressure low.  Decrease lisinopril to 5 mg once daily  # Fatigue-could be because of low blood pressure as she does complain of rare dizziness.  Decrease lisinopril to 5 mg once daily        RTC 4,m       - she has 3 kids 20 yo boy ( T 1 DM dx7 yrs ago) , 19 yo daughtr and 14 yo daughter.   Advised can get her daughter checked for antibodies for type 1 diabetes and possible teplizumab eligibility  She is a teacher   Breast reduction surgery in past

## 2025-07-28 DIAGNOSIS — E10.65 TYPE 1 DIABETES MELLITUS WITH HYPERGLYCEMIA, WITH LONG-TERM CURRENT USE OF INSULIN: ICD-10-CM

## 2025-07-28 RX ORDER — TIRZEPATIDE 7.5 MG/.5ML
7.5 INJECTION, SOLUTION SUBCUTANEOUS
Qty: 6 ML | Refills: 1 | Status: SHIPPED | OUTPATIENT
Start: 2025-07-28

## 2025-10-16 ENCOUNTER — APPOINTMENT (OUTPATIENT)
Dept: ENDOCRINOLOGY | Facility: CLINIC | Age: 50
End: 2025-10-16
Payer: COMMERCIAL

## 2025-11-25 ENCOUNTER — APPOINTMENT (OUTPATIENT)
Dept: ENDOCRINOLOGY | Facility: CLINIC | Age: 50
End: 2025-11-25
Payer: COMMERCIAL

## 2026-02-05 ENCOUNTER — APPOINTMENT (OUTPATIENT)
Dept: ENDOCRINOLOGY | Facility: CLINIC | Age: 51
End: 2026-02-05
Payer: COMMERCIAL

## 2026-06-18 ENCOUNTER — APPOINTMENT (OUTPATIENT)
Dept: ENDOCRINOLOGY | Facility: CLINIC | Age: 51
End: 2026-06-18
Payer: COMMERCIAL